# Patient Record
Sex: MALE | Race: WHITE
[De-identification: names, ages, dates, MRNs, and addresses within clinical notes are randomized per-mention and may not be internally consistent; named-entity substitution may affect disease eponyms.]

---
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
## 2021-09-07 ENCOUNTER — APPOINTMENT (OUTPATIENT)
Dept: PEDIATRICS | Facility: CLINIC | Age: 21
End: 2021-09-07
Payer: COMMERCIAL

## 2021-09-07 VITALS — TEMPERATURE: 97.7 F | WEIGHT: 162.3 LBS

## 2021-09-07 DIAGNOSIS — J01.90 ACUTE SINUSITIS, UNSPECIFIED: ICD-10-CM

## 2021-09-07 DIAGNOSIS — J06.9 ACUTE UPPER RESPIRATORY INFECTION, UNSPECIFIED: ICD-10-CM

## 2021-09-07 LAB — SARS-COV-2 AG RESP QL IA.RAPID: NEGATIVE

## 2021-09-07 PROCEDURE — 99214 OFFICE O/P EST MOD 30 MIN: CPT

## 2021-09-07 PROCEDURE — 87811 SARS-COV-2 COVID19 W/OPTIC: CPT

## 2021-09-07 NOTE — PHYSICAL EXAM
[Tired appearing] : tired appearing [NL] : warm [de-identified] : throat is red. no pus [FreeTextEntry7] : thick productive cough. no wheeze or rales

## 2021-09-07 NOTE — BEGINNING OF VISIT
[Patient] : patient [FreeTextEntry1] : 19 yo boy here for 2 days of chills, congestion, cough. vomited once. no diarrhea. + post nasal drip. went to visit a  girl friend this past weekend at [place]. no known fever.

## 2021-09-07 NOTE — DISCUSSION/SUMMARY
[FreeTextEntry1] : 19 yo boy with uri /cough/chills/vomit. rapid covid test is negative. He has appt to have pcr in 2 days. Will treat for sinus infection. amoxil rx'd. otc cold meds may be given as well.

## 2021-11-17 ENCOUNTER — APPOINTMENT (OUTPATIENT)
Dept: PEDIATRICS | Facility: CLINIC | Age: 21
End: 2021-11-17
Payer: COMMERCIAL

## 2021-11-17 VITALS — TEMPERATURE: 98.1 F | WEIGHT: 159.9 LBS

## 2021-11-17 DIAGNOSIS — Z87.898 PERSONAL HISTORY OF OTHER SPECIFIED CONDITIONS: ICD-10-CM

## 2021-11-17 DIAGNOSIS — U07.1 COVID-19: ICD-10-CM

## 2021-11-17 LAB
FLUAV SPEC QL CULT: NEGATIVE
FLUBV AG SPEC QL IA: NEGATIVE
S PYO AG SPEC QL IA: NEGATIVE

## 2021-11-17 PROCEDURE — 87804 INFLUENZA ASSAY W/OPTIC: CPT | Mod: QW

## 2021-11-17 PROCEDURE — 87880 STREP A ASSAY W/OPTIC: CPT | Mod: QW

## 2021-11-17 PROCEDURE — 99214 OFFICE O/P EST MOD 30 MIN: CPT

## 2021-11-17 RX ORDER — AMOXICILLIN 875 MG/1
875 TABLET, FILM COATED ORAL
Qty: 20 | Refills: 0 | Status: COMPLETED | COMMUNITY
Start: 2021-09-07 | End: 2021-11-17

## 2021-11-17 NOTE — PHYSICAL EXAM
[Supple] : supple [FROM] : full passive range of motion [NL] : warm [FreeTextEntry1] : voice is muffled. looks tired [de-identified] : tonsils are very enlarged and red. no real exudate [FreeTextEntry7] : clear lungs [de-identified] : bilateral anterior cervical tender nodes. no other adenopathy [FreeTextEntry9] : no hsm [de-identified] : no rash

## 2021-11-17 NOTE — DISCUSSION/SUMMARY
[FreeTextEntry1] : 21 yo boy with tonsillitis and cervical adenitis.  muffled voice, chills, fever, headache, nausea. RST is negative. regular throat culture sent to lab. covid pcr test sent to lab. rapid flu test is negative . will start him on amoxil and see if it helps. If still not feeling well in 5 days will test for mononucleosis. blood lab slip given to patient . advised to drink more fluids and take advil for any throat pain or fever.

## 2021-11-17 NOTE — BEGINNING OF VISIT
[FreeTextEntry1] : 19 yo boy here for sore throat, muffled voice,  and chills, headache . started yesterday. advil taken 2 hours. coughing. + nausea. very hard to swallow

## 2021-11-19 ENCOUNTER — NON-APPOINTMENT (OUTPATIENT)
Age: 21
End: 2021-11-19

## 2021-11-19 LAB — SARS-COV-2 N GENE NPH QL NAA+PROBE: NOT DETECTED

## 2021-11-22 LAB — BACTERIA THROAT CULT: NORMAL

## 2021-11-23 ENCOUNTER — NON-APPOINTMENT (OUTPATIENT)
Age: 21
End: 2021-11-23

## 2021-11-23 LAB
ALBUMIN SERPL ELPH-MCNC: 4.6 G/DL
ALP BLD-CCNC: 72 U/L
ALT SERPL-CCNC: 29 U/L
ANION GAP SERPL CALC-SCNC: 16 MMOL/L
AST SERPL-CCNC: 16 U/L
BASOPHILS # BLD AUTO: 0.05 K/UL
BASOPHILS NFR BLD AUTO: 0.8 %
BILIRUB SERPL-MCNC: 0.3 MG/DL
BUN SERPL-MCNC: 18 MG/DL
CALCIUM SERPL-MCNC: 10 MG/DL
CHLORIDE SERPL-SCNC: 103 MMOL/L
CO2 SERPL-SCNC: 23 MMOL/L
CREAT SERPL-MCNC: 0.93 MG/DL
EBV EA AB SER IA-ACNC: <5 U/ML
EBV EA AB TITR SER IF: POSITIVE
EBV EA IGG SER QL IA: >600 U/ML
EBV EA IGG SER-ACNC: NEGATIVE
EBV EA IGM SER IA-ACNC: NEGATIVE
EBV PATRN SPEC IB-IMP: NORMAL
EBV VCA IGG SER IA-ACNC: 329 U/ML
EBV VCA IGM SER QL IA: 14.8 U/ML
EOSINOPHIL # BLD AUTO: 0.55 K/UL
EOSINOPHIL NFR BLD AUTO: 8.5 %
EPSTEIN-BARR VIRUS CAPSID ANTIGEN IGG: POSITIVE
GLUCOSE SERPL-MCNC: 81 MG/DL
HCT VFR BLD CALC: 49 %
HGB BLD-MCNC: 16.5 G/DL
IMM GRANULOCYTES NFR BLD AUTO: 0.5 %
LYMPHOCYTES # BLD AUTO: 2.13 K/UL
LYMPHOCYTES NFR BLD AUTO: 33 %
MAN DIFF?: NORMAL
MCHC RBC-ENTMCNC: 29.5 PG
MCHC RBC-ENTMCNC: 33.7 GM/DL
MCV RBC AUTO: 87.5 FL
MONOCYTES # BLD AUTO: 0.43 K/UL
MONOCYTES NFR BLD AUTO: 6.7 %
NEUTROPHILS # BLD AUTO: 3.26 K/UL
NEUTROPHILS NFR BLD AUTO: 50.5 %
PLATELET # BLD AUTO: 327 K/UL
POTASSIUM SERPL-SCNC: 5.6 MMOL/L
PROT SERPL-MCNC: 7.2 G/DL
RBC # BLD: 5.6 M/UL
RBC # FLD: 12.7 %
SODIUM SERPL-SCNC: 143 MMOL/L
WBC # FLD AUTO: 6.45 K/UL

## 2021-12-29 ENCOUNTER — APPOINTMENT (OUTPATIENT)
Dept: PEDIATRICS | Facility: CLINIC | Age: 21
End: 2021-12-29
Payer: COMMERCIAL

## 2021-12-29 VITALS — TEMPERATURE: 98.5 F | WEIGHT: 160.5 LBS

## 2021-12-29 DIAGNOSIS — R68.83 CHILLS (WITHOUT FEVER): ICD-10-CM

## 2021-12-29 DIAGNOSIS — Z87.898 PERSONAL HISTORY OF OTHER SPECIFIED CONDITIONS: ICD-10-CM

## 2021-12-29 DIAGNOSIS — I88.9 NONSPECIFIC LYMPHADENITIS, UNSPECIFIED: ICD-10-CM

## 2021-12-29 DIAGNOSIS — J03.90 ACUTE TONSILLITIS, UNSPECIFIED: ICD-10-CM

## 2021-12-29 LAB — SARS-COV-2 AG RESP QL IA.RAPID: NEGATIVE

## 2021-12-29 PROCEDURE — 87811 SARS-COV-2 COVID19 W/OPTIC: CPT | Mod: QW

## 2021-12-29 PROCEDURE — 99213 OFFICE O/P EST LOW 20 MIN: CPT | Mod: 25

## 2021-12-29 RX ORDER — AMOXICILLIN 250 MG/1
250 CAPSULE ORAL
Qty: 60 | Refills: 0 | Status: COMPLETED | COMMUNITY
Start: 2021-11-17 | End: 2021-12-29

## 2021-12-29 NOTE — DISCUSSION/SUMMARY
[FreeTextEntry1] : 22 yo boy with close exposure to brother with covid, now with headache, runny nose. rapid covid test is negative\par .covid  pcr test taken . will call with result when available.

## 2021-12-29 NOTE — PHYSICAL EXAM
[Clear Rhinorrhea] : clear rhinorrhea [NL] : warm [Tired appearing] : tired appearing [de-identified] : granular throat

## 2021-12-29 NOTE — BEGINNING OF VISIT
[Patient] : patient [FreeTextEntry1] : 22 yo boy here for bad headache. no fever. + chills. brother has covid. He did not take any pain meds for the headache.

## 2021-12-31 ENCOUNTER — NON-APPOINTMENT (OUTPATIENT)
Age: 21
End: 2021-12-31

## 2021-12-31 LAB — SARS-COV-2 N GENE NPH QL NAA+PROBE: NOT DETECTED

## 2022-01-02 ENCOUNTER — TRANSCRIPTION ENCOUNTER (OUTPATIENT)
Age: 22
End: 2022-01-02

## 2022-02-14 ENCOUNTER — APPOINTMENT (OUTPATIENT)
Dept: PEDIATRICS | Facility: CLINIC | Age: 22
End: 2022-02-14
Payer: COMMERCIAL

## 2022-02-14 VITALS — TEMPERATURE: 98.1 F | WEIGHT: 162.2 LBS

## 2022-02-14 PROCEDURE — 99213 OFFICE O/P EST LOW 20 MIN: CPT

## 2022-02-14 NOTE — BEGINNING OF VISIT
[Patient] : patient [FreeTextEntry1] : 20 yo boy here for lump under right chin for a week. Also has a lump  in lower abdomen for 1 to 2 months that he thinks is a cyst. wisdom tooth is coming in on the right.

## 2022-02-14 NOTE — PHYSICAL EXAM
[Supple] : supple [FROM] : full passive range of motion [Beka: ____] : Beka [unfilled] [Circumcised] : circumcised [NL] : normotonic [de-identified] : right lower 12 year molar tender to palpate with tongue depressor. no visible wisdom tooth coming in. no open sores or blisters [de-identified] : 1 cm node palpated right submandibular area. no other adenopathy [de-identified] : suprapubic area with multiple areas if impetigo, yellow, crusted.

## 2022-02-14 NOTE — DISCUSSION/SUMMARY
[FreeTextEntry1] : 20 yo boy with impetigo in suprapubic area and slightly enlarged right submandibular node in neck. His right lower 12 year molar is tender to palpate with tongue depressor. He thinks he is getting a wisdom tooth there as well but I do not see that on exam. Advised him to see dentist and will treat the impetigo with cephalexin and topical mupirocin. He had shaved the area with old razor. Advised him to use new razor or clean the other in rubbing alcohol before use.

## 2022-02-25 ENCOUNTER — NON-APPOINTMENT (OUTPATIENT)
Age: 22
End: 2022-02-25

## 2022-04-22 ENCOUNTER — APPOINTMENT (OUTPATIENT)
Dept: PEDIATRICS | Facility: CLINIC | Age: 22
End: 2022-04-22
Payer: COMMERCIAL

## 2022-04-22 VITALS — TEMPERATURE: 98.7 F | WEIGHT: 165.6 LBS

## 2022-04-22 DIAGNOSIS — Z20.822 CONTACT WITH AND (SUSPECTED) EXPOSURE TO COVID-19: ICD-10-CM

## 2022-04-22 DIAGNOSIS — R51.9 HEADACHE, UNSPECIFIED: ICD-10-CM

## 2022-04-22 DIAGNOSIS — R09.89 OTHER SPECIFIED SYMPTOMS AND SIGNS INVOLVING THE CIRCULATORY AND RESPIRATORY SYSTEMS: ICD-10-CM

## 2022-04-22 DIAGNOSIS — K08.89 OTHER SPECIFIED DISORDERS OF TEETH AND SUPPORTING STRUCTURES: ICD-10-CM

## 2022-04-22 DIAGNOSIS — R59.0 LOCALIZED ENLARGED LYMPH NODES: ICD-10-CM

## 2022-04-22 PROCEDURE — 99214 OFFICE O/P EST MOD 30 MIN: CPT

## 2022-04-22 NOTE — BEGINNING OF VISIT
[Patient] : patient [FreeTextEntry1] : 22 yo boy here for skin rash that is still there. It went away for the most part when treated a few months ago but it has returned.  no fever. Using dove soap. Patient also asking for a nicotine patch to help with his nicotine vape addiction.

## 2022-04-22 NOTE — PHYSICAL EXAM
[Supple] : supple [FROM] : full passive range of motion [NL] : normotonic [de-identified] : no nodes [de-identified] : suprapubic area with 4 different areas in erythema, the most superior one is slightly tender and hard. lesions are under 1 inch in diameter.

## 2022-04-22 NOTE — DISCUSSION/SUMMARY
[FreeTextEntry1] : 20 yo boy with recurrent impetigo in the suprapubic region . The area is shaved and it looks like this is caused by folliculitis. I advised him to change the razor more frequently and rinse it often in rubbing alcohol. will treat with 7 days of bactrim. \par  He also is addicted to vaping nicotine and is asking for nicotine patch. He vapes all day long. will rx 7 mg patch to start. Counseled on reducing the puffs per day while on the patch.

## 2022-06-24 ENCOUNTER — APPOINTMENT (OUTPATIENT)
Dept: PEDIATRICS | Facility: CLINIC | Age: 22
End: 2022-06-24
Payer: COMMERCIAL

## 2022-06-24 VITALS — TEMPERATURE: 98.4 F | WEIGHT: 159.44 LBS

## 2022-06-24 DIAGNOSIS — Z87.2 PERSONAL HISTORY OF DISEASES OF THE SKIN AND SUBCUTANEOUS TISSUE: ICD-10-CM

## 2022-06-24 PROCEDURE — 99213 OFFICE O/P EST LOW 20 MIN: CPT

## 2022-06-24 RX ORDER — DOXYCYCLINE HYCLATE 100 MG/1
100 TABLET ORAL
Qty: 14 | Refills: 0 | Status: DISCONTINUED | COMMUNITY
Start: 2022-05-09

## 2022-06-24 RX ORDER — CLINDAMYCIN PHOSPHATE 10 MG/ML
1 SOLUTION TOPICAL
Qty: 60 | Refills: 0 | Status: DISCONTINUED | COMMUNITY
Start: 2022-05-09

## 2022-06-24 RX ORDER — CEPHALEXIN 500 MG/1
500 CAPSULE ORAL
Qty: 20 | Refills: 0 | Status: DISCONTINUED | COMMUNITY
Start: 2022-02-14 | End: 2022-06-24

## 2022-06-24 RX ORDER — MUPIROCIN 20 MG/G
2 OINTMENT TOPICAL
Qty: 22 | Refills: 1 | Status: DISCONTINUED | COMMUNITY
Start: 2022-02-14 | End: 2022-06-24

## 2022-06-24 RX ORDER — CEPHALEXIN 500 MG/1
500 CAPSULE ORAL
Qty: 20 | Refills: 0 | Status: COMPLETED | COMMUNITY
Start: 2022-06-24 | End: 2022-07-04

## 2022-06-24 RX ORDER — BETAMETHASONE DIPROPIONATE 0.5 MG/G
0.05 CREAM, AUGMENTED TOPICAL
Qty: 50 | Refills: 0 | Status: DISCONTINUED | COMMUNITY
Start: 2022-06-04

## 2022-06-24 RX ORDER — SULFAMETHOXAZOLE AND TRIMETHOPRIM 800; 160 MG/1; MG/1
800-160 TABLET ORAL
Qty: 14 | Refills: 0 | Status: DISCONTINUED | COMMUNITY
Start: 2022-04-22 | End: 2022-06-24

## 2022-06-24 NOTE — PHYSICAL EXAM
[Submandibular] : submandibular [Anterior Cervical] : anterior cervical [Posterior Cervical] : posterior cervical [NL] : warm [FreeTextEntry4] : Small  erythematous area right nares

## 2022-08-24 ENCOUNTER — NON-APPOINTMENT (OUTPATIENT)
Age: 22
End: 2022-08-24

## 2022-08-26 ENCOUNTER — NON-APPOINTMENT (OUTPATIENT)
Age: 22
End: 2022-08-26

## 2022-08-26 RX ORDER — NICOTINE 21 MG/24HR
14 PATCH, TRANSDERMAL 24 HOURS TRANSDERMAL DAILY
Qty: 14 | Refills: 2 | Status: ACTIVE | COMMUNITY
Start: 2022-08-26 | End: 1900-01-01

## 2022-08-26 RX ORDER — PHENYLEPHRINE HCL 10 MG
7 TABLET ORAL DAILY
Qty: 1 | Refills: 2 | Status: COMPLETED | COMMUNITY
Start: 2022-04-22 | End: 2022-08-26

## 2022-09-19 ENCOUNTER — APPOINTMENT (OUTPATIENT)
Dept: PEDIATRICS | Facility: CLINIC | Age: 22
End: 2022-09-19

## 2022-09-19 VITALS — TEMPERATURE: 98.9 F | WEIGHT: 159.2 LBS

## 2022-09-19 PROCEDURE — 99213 OFFICE O/P EST LOW 20 MIN: CPT

## 2022-09-20 ENCOUNTER — NON-APPOINTMENT (OUTPATIENT)
Age: 22
End: 2022-09-20

## 2022-09-21 ENCOUNTER — NON-APPOINTMENT (OUTPATIENT)
Age: 22
End: 2022-09-21

## 2022-09-27 ENCOUNTER — APPOINTMENT (OUTPATIENT)
Age: 22
End: 2022-09-27
Payer: COMMERCIAL

## 2022-09-27 ENCOUNTER — NON-APPOINTMENT (OUTPATIENT)
Age: 22
End: 2022-09-27

## 2022-09-27 VITALS — HEIGHT: 66 IN | WEIGHT: 157 LBS | BODY MASS INDEX: 25.23 KG/M2

## 2022-09-27 PROCEDURE — 10160 PNXR ASPIR ABSC HMTMA BULLA: CPT

## 2022-09-27 PROCEDURE — 99205 OFFICE O/P NEW HI 60 MIN: CPT | Mod: 25

## 2022-09-27 PROCEDURE — 73130 X-RAY EXAM OF HAND: CPT | Mod: LT

## 2022-09-30 LAB
BASOPHILS # BLD AUTO: 0.03 K/UL
BASOPHILS NFR BLD AUTO: 0.5 %
CRP SERPL-MCNC: <3 MG/L
EOSINOPHIL # BLD AUTO: 0.38 K/UL
EOSINOPHIL NFR BLD AUTO: 6.4 %
ERYTHROCYTE [SEDIMENTATION RATE] IN BLOOD BY WESTERGREN METHOD: 2 MM/HR
HCT VFR BLD CALC: 47.5 %
HGB BLD-MCNC: 16.4 G/DL
IMM GRANULOCYTES NFR BLD AUTO: 0.7 %
LYMPHOCYTES # BLD AUTO: 1.3 K/UL
LYMPHOCYTES NFR BLD AUTO: 21.9 %
MAN DIFF?: NORMAL
MCHC RBC-ENTMCNC: 30.4 PG
MCHC RBC-ENTMCNC: 34.5 GM/DL
MCV RBC AUTO: 88.1 FL
MONOCYTES # BLD AUTO: 0.39 K/UL
MONOCYTES NFR BLD AUTO: 6.6 %
NEUTROPHILS # BLD AUTO: 3.79 K/UL
NEUTROPHILS NFR BLD AUTO: 63.9 %
PLATELET # BLD AUTO: 289 K/UL
RBC # BLD: 5.39 M/UL
RBC # FLD: 12.3 %
WBC # FLD AUTO: 5.93 K/UL

## 2022-10-03 ENCOUNTER — APPOINTMENT (OUTPATIENT)
Dept: PEDIATRICS | Facility: CLINIC | Age: 22
End: 2022-10-03

## 2022-10-03 VITALS
SYSTOLIC BLOOD PRESSURE: 118 MMHG | DIASTOLIC BLOOD PRESSURE: 73 MMHG | HEIGHT: 66.22 IN | HEART RATE: 88 BPM | BODY MASS INDEX: 25.62 KG/M2 | WEIGHT: 159.4 LBS

## 2022-10-03 DIAGNOSIS — B99.9 UNSPECIFIED INFECTIOUS DISEASE: ICD-10-CM

## 2022-10-03 DIAGNOSIS — Z92.89 PERSONAL HISTORY OF OTHER MEDICAL TREATMENT: ICD-10-CM

## 2022-10-03 DIAGNOSIS — L08.9 LOCAL INFECTION OF THE SKIN AND SUBCUTANEOUS TISSUE, UNSPECIFIED: ICD-10-CM

## 2022-10-03 DIAGNOSIS — L03.119 CELLULITIS OF UNSPECIFIED PART OF LIMB: ICD-10-CM

## 2022-10-03 DIAGNOSIS — F41.9 ANXIETY DISORDER, UNSPECIFIED: ICD-10-CM

## 2022-10-03 DIAGNOSIS — Z00.00 ENCOUNTER FOR GENERAL ADULT MEDICAL EXAMINATION W/OUT ABNORMAL FINDINGS: ICD-10-CM

## 2022-10-03 DIAGNOSIS — Z23 ENCOUNTER FOR IMMUNIZATION: ICD-10-CM

## 2022-10-03 DIAGNOSIS — M41.126 ADOLESCENT IDIOPATHIC SCOLIOSIS, LUMBAR REGION: ICD-10-CM

## 2022-10-03 DIAGNOSIS — F17.200 NICOTINE DEPENDENCE, UNSPECIFIED, UNCOMPLICATED: ICD-10-CM

## 2022-10-03 LAB — BACTERIA WND CULT: NORMAL

## 2022-10-03 PROCEDURE — 90715 TDAP VACCINE 7 YRS/> IM: CPT

## 2022-10-03 PROCEDURE — 99173 VISUAL ACUITY SCREEN: CPT | Mod: 59

## 2022-10-03 PROCEDURE — 96160 PT-FOCUSED HLTH RISK ASSMT: CPT | Mod: 59

## 2022-10-03 PROCEDURE — 99395 PREV VISIT EST AGE 18-39: CPT | Mod: 25

## 2022-10-03 PROCEDURE — 90471 IMMUNIZATION ADMIN: CPT

## 2022-10-03 PROCEDURE — 96127 BRIEF EMOTIONAL/BEHAV ASSMT: CPT

## 2022-10-03 PROCEDURE — 92551 PURE TONE HEARING TEST AIR: CPT

## 2022-10-03 RX ORDER — DOXYCYCLINE HYCLATE 100 MG/1
100 TABLET ORAL
Refills: 0 | Status: ACTIVE | COMMUNITY

## 2022-10-03 NOTE — PHYSICAL EXAM
[Alert] : alert [No Acute Distress] : no acute distress [Normocephalic] : normocephalic [EOMI Bilateral] : EOMI bilateral [Clear tympanic membranes with bony landmarks and light reflex present bilaterally] : clear tympanic membranes with bony landmarks and light reflex present bilaterally  [Pink Nasal Mucosa] : pink nasal mucosa [Nonerythematous Oropharynx] : nonerythematous oropharynx [Supple, full passive range of motion] : supple, full passive range of motion [No Palpable Masses] : no palpable masses [Clear to Auscultation Bilaterally] : clear to auscultation bilaterally [Regular Rate and Rhythm] : regular rate and rhythm [Normal S1, S2 audible] : normal S1, S2 audible [No Murmurs] : no murmurs [+2 Femoral Pulses] : +2 femoral pulses [Soft] : soft [NonTender] : non tender [Non Distended] : non distended [Normoactive Bowel Sounds] : normoactive bowel sounds [No Hepatomegaly] : no hepatomegaly [No Splenomegaly] : no splenomegaly [Beka: _____] : Beka [unfilled] [Circumcised] : circumcised [Bilateral descended testes] : bilateral descended testes [No Testicular Masses] : no testicular masses [No Abnormal Lymph Nodes Palpated] : no abnormal lymph nodes palpated [Normal Muscle Tone] : normal muscle tone [No Gait Asymmetry] : no gait asymmetry [No pain or deformities with palpation of bone, muscles, joints] : no pain or deformities with palpation of bone, muscles, joints [Straight] : straight [No Scoliosis] : no scoliosis [+2 Patella DTR] : +2 patella DTR [Cranial Nerves Grossly Intact] : cranial nerves grossly intact [No Rash or Lesions] : no rash or lesions [FreeTextEntry6] : no hernia or mass [de-identified] : left hand with residual red area on palmar thumb area. no tenderness, and is now able to move hand , make a fist and use the hand.  [de-identified] : suprapubic area with healing residual impetigo.

## 2022-10-03 NOTE — PHYSICAL EXAM
[NL] : moves all extremities x4, warm, well perfused x4 [Warm] : warm [Erythematous] : erythematous [Tender] : tender [Hands] : hands [de-identified] : swelling and tenderness at base of L thumb. cellulitis encircles area and is advancing proximally on posterior aspect of lL hand.

## 2022-10-03 NOTE — REVIEW OF SYSTEMS
[Restriction of Motion] : restriction of motion [Redness of Joint] : redness of joint [Abrasion] : abrasion [Negative] : Gastrointestinal

## 2022-10-03 NOTE — HISTORY OF PRESENT ILLNESS
[No] : Patient does not go to dentist yearly [Needs Immunizations] : needs immunizations [Eats meals with family] : eats meals with family [Has family members/adults to turn to for help] : has family members/adults to turn to for help [Eats regular meals including adequate fruits and vegetables] : eats regular meals including adequate fruits and vegetables [Drinks non-sweetened liquids] : drinks non-sweetened liquids  [Has friends] : has friends [At least 1 hour of physical activity a day] : at least 1 hour of physical activity a day [Uses electronic nicotine delivery system] : uses electronic nicotine delivery system [Uses tobacco] : uses tobacco [Uses drugs] : uses drugs  [Drinks alcohol] : drinks alcohol [Uses safety belts/safety equipment] : uses safety belts/safety equipment  [Yes] : Patient has had sexual intercourse. [Has ways to cope with stress] : has ways to cope with stress [Displays self-confidence] : displays self-confidence [Sleep Concerns] : no sleep concerns [Exposure to electronic nicotine delivery system] : no exposure to electronic nicotine delivery system [Exposure to tobacco] : no exposure to tobacco [Exposure to drugs] : no exposure to drugs [Exposure to alcohol] : no exposure to alcohol [Impaired/distracted driving] : no impaired/distracted driving [Has problems with sleep] : does not have problems with sleep [Has thought about hurting self or considered suicide] : has not thought about hurting self or considered suicide [FreeTextEntry7] :  patient had left hand infection that Dr. Cheatham sent him to [facility] for . ED put him on oral cephalexin and sent him home, It got worse, he saw dermatologist for it who incised and drained it and cultured it, put him on clindamycin . He then went to hand surgeon who put him on doxycycline. He stuck needle in and cultured it . that grew nothing. The culture from the dermatologist grew MRSA. He was told by hand surgeon to stay on the antibiotic for a month.  [de-identified] : brushes teeth daily [de-identified] : sleeps  6 to 7 hours , more on weekends.  [de-identified] : works as  [de-identified] : vapes nicotine and smokes marijuana joints. drinks some alcohol.  [de-identified] :  no history of std. not sexually active now.  [de-identified] : + anxiety

## 2022-10-03 NOTE — DISCUSSION/SUMMARY
[FreeTextEntry1] : 21 year old boy with 2-3d hx of swelling, tenderness and redness of base of L thumb. no fever. he works as a  and has several sm cuts on both hands. pain and swelling is getting worse and it is difficult to flex his thumb. on exam he has cellulitis of the area which is advancing up the posterior aspect of his hand/ thenar eminence is also swollen and tender to touch. advise going to ED for eval, possible i & d and antibiotics. i asked yuliana to call the office tonite or tomorrow for follow up.

## 2022-10-03 NOTE — DISCUSSION/SUMMARY
[] : The components of the vaccine(s) to be administered today are listed in the plan of care. The disease(s) for which the vaccine(s) are intended to prevent and the risks have been discussed with the caretaker.  The risks are also included in the appropriate vaccination information statements which have been provided to the patient's caregiver.  The caregiver has given consent to vaccinate. [FreeTextEntry1] : 20 yo boy doing well s/p abscess left hand. He is still on doxycycline for it. Grew MRSA. \par TOday he rec'd the Tdap vaccine. vis given and explained.\par He has not been using the nicotine patches . He still vapes nicotine often. I discussed the reduced wound healing with nicotine use. He also smoke marijuana joints. He is generally anxious. I advised him to see psychiatrist for evaluation and management of his anxiety to help him with reducing nicotine self medication. \par Will f/u with hand surgeon this week. \par PHQ 9 score of 12, mostly due to anxiety and signs of adhd, which I have advised him for years to have evaluation for .

## 2022-10-03 NOTE — HISTORY OF PRESENT ILLNESS
[___ Day(s)] : [unfilled] day(s) [Constant] : constant [de-identified] : 21 year old boy with 2-3d hx of swelling, tenderness and redness of base of L thumb. no fever. [FreeTextEntry8] : moving or bending thumb [FreeTextEntry4] : tylenol, heat and ice [FreeTextEntry5] : works as a

## 2022-10-04 ENCOUNTER — APPOINTMENT (OUTPATIENT)
Dept: ORTHOPEDIC SURGERY | Facility: CLINIC | Age: 22
End: 2022-10-04

## 2022-10-04 PROCEDURE — 99212 OFFICE O/P EST SF 10 MIN: CPT

## 2022-10-04 NOTE — HISTORY OF PRESENT ILLNESS
[FreeTextEntry1] : Follow-up for left first webspace abscess.  Patient reports that he has been taking his doxycycline and his symptoms continue to improve.  He still has more tablets remaining.  He denies fevers or chills.  Has an indurated area that is not fluctuant nor is it erythematous.  He denies fevers and chills and has been using the hand more.  He is overall doing better.

## 2022-10-04 NOTE — HISTORY OF PRESENT ILLNESS
[FreeTextEntry1] : Dominant male presents with left first webspace injury and resultant abscess.  He reports that he sustained an injury while climbing, this was not work-related.  He subsequently developed an abscess.  He went to the emergency department who started him on antibiotics but did not perform an I&D.  He then went to his dermatologist who did perform an I&D but did not send cultures.  He presents today with resolution of the abscess but with some mild area of erythema and pain.  Here to determine if he needs further intervention.  Denies other injuries denies paresthesias denies limitations in range of motion

## 2022-10-04 NOTE — PHYSICAL EXAM
[de-identified] : General: NAD\par RSP: Nonlabored\par Musculoskeletal:\par LUE:\par Indurated/ firm area in first webspace without erythema or fluctuance\par New skin healing from prior area of debridement\par Full range of motion\par SILT throughout\par 2+ radial pulse\par \par WBC 5.93\par ESR: 2\par CRP: <3\par Cultures: No growth

## 2022-10-04 NOTE — PROCEDURE
[FreeTextEntry1] : Patient apprised of the risks and benefits of aspiration and culture.  He offered verbal consent\par Wound sterilely prepped\par Aspiration of sanguinous fluid from previous area of abscess\par Anaerobic aerobic culture sent\par Sterile Band-Aid placed\par Patient tolerated procedure well without complication

## 2022-10-04 NOTE — PHYSICAL EXAM
[de-identified] : General: No acute distress\par Respiratory: Nonlabored\par Musculoskeletal:\par Left upper extremity was seen and examined\par Healing incision/wound at the first webspace, mild thickening of tissues without induration, no fluctuance, mild erythema\par Mildly tender to palpation at wound\par Sensation tact light touch throughout\par Full range of motion\par 2+ radial pulse

## 2022-10-04 NOTE — ASSESSMENT
[FreeTextEntry1] : 21 year-old male returns for a wound check at the first [facility].  He is doing well with resolved erythema but does have residual induration which may be scar formation vs. residual infection.  Continue abx and wound check next week.  White count, inflammatory markers and culture are no growth.\par \par Plan:\par Wound check in 1 week\par Continue antibiotics\par Call if symptoms worsen prior to that follow-up

## 2022-10-04 NOTE — ASSESSMENT
[FreeTextEntry1] : 21-year-old male with a 6-day history of a left thenar/first webspace abscess that has successfully underwent I&D with his dermatologist who presents today for further treatment recommendations.  We will follow-up cultures that were obtained in the office today.  We will send for labs to trend CBC ESR and CRP.  Follow-up in 1 week's time.  Call with questions or concerns.

## 2022-10-18 ENCOUNTER — APPOINTMENT (OUTPATIENT)
Dept: ORTHOPEDIC SURGERY | Facility: CLINIC | Age: 22
End: 2022-10-18

## 2023-09-28 ENCOUNTER — NON-APPOINTMENT (OUTPATIENT)
Age: 23
End: 2023-09-28

## 2023-10-02 ENCOUNTER — APPOINTMENT (OUTPATIENT)
Dept: ORTHOPEDIC SURGERY | Facility: CLINIC | Age: 23
End: 2023-10-02
Payer: COMMERCIAL

## 2023-10-02 VITALS — HEIGHT: 66.2 IN | WEIGHT: 159 LBS | BODY MASS INDEX: 25.55 KG/M2

## 2023-10-02 DIAGNOSIS — S43.402D UNSPECIFIED SPRAIN OF LEFT SHOULDER JOINT, SUBSEQUENT ENCOUNTER: ICD-10-CM

## 2023-10-02 PROCEDURE — 99204 OFFICE O/P NEW MOD 45 MIN: CPT

## 2023-10-02 PROCEDURE — 73030 X-RAY EXAM OF SHOULDER: CPT | Mod: LT

## 2023-10-02 RX ORDER — MELOXICAM 15 MG/1
15 TABLET ORAL
Qty: 30 | Refills: 0 | Status: ACTIVE | COMMUNITY
Start: 2023-10-02 | End: 1900-01-01